# Patient Record
Sex: FEMALE | URBAN - METROPOLITAN AREA
[De-identification: names, ages, dates, MRNs, and addresses within clinical notes are randomized per-mention and may not be internally consistent; named-entity substitution may affect disease eponyms.]

---

## 2024-05-03 ENCOUNTER — OFFICE VISIT (OUTPATIENT)
Age: 39
End: 2024-05-03

## 2024-05-03 VITALS
WEIGHT: 262 LBS | OXYGEN SATURATION: 99 % | HEART RATE: 72 BPM | DIASTOLIC BLOOD PRESSURE: 80 MMHG | SYSTOLIC BLOOD PRESSURE: 132 MMHG | HEIGHT: 67 IN | BODY MASS INDEX: 41.12 KG/M2

## 2024-05-03 DIAGNOSIS — R73.03 PREDIABETES: ICD-10-CM

## 2024-05-03 DIAGNOSIS — Z82.49 FAMILY HISTORY OF HEART DISEASE: ICD-10-CM

## 2024-05-03 DIAGNOSIS — Z82.49 FAMILY HISTORY OF HEART ATTACK: Primary | ICD-10-CM

## 2024-05-03 DIAGNOSIS — R07.9 CHEST PAIN, UNSPECIFIED TYPE: ICD-10-CM

## 2024-05-03 RX ORDER — METFORMIN HYDROCHLORIDE 500 MG/1
500 TABLET, EXTENDED RELEASE ORAL
COMMUNITY

## 2024-05-03 ASSESSMENT — ENCOUNTER SYMPTOMS
GASTROINTESTINAL NEGATIVE: 1
EYES NEGATIVE: 1
RESPIRATORY NEGATIVE: 1

## 2024-05-03 NOTE — PATIENT INSTRUCTIONS
the medication list included in this document is our record of what you are currently taking, including any changes that were made at today's visit.  If you find any differences when compared to your medications at home, or have any questions that were not answered at your visit, please contact the office.

## 2024-05-03 NOTE — PROGRESS NOTES
mg/dL   03/19/2024 N   Interpretive Data: Child: Range(mg/dL)  Desirable &lt;170  Borderline 170 to 199  High &gt;=200  Adult: Range(mg/dL)  Desirable &lt;200  Borderline 200 to 239  High &gt;=240 Ambulatory Pharmacy   Chemistry Triglycerides 96 mg/dL   03/19/2024 N   Interpretive Data: NORMAL &lt;150  BORDERLINE HIGH 150   HIGH 200   VERY HIGH &gt;=500 Ambulatory Pharmacy   Chemistry VLDL 19.00 mg/dL   03/19             Joceline was seen today for new patient.    Diagnoses and all orders for this visit:    Family history of heart attack    Family history of heart disease  -     EKG 12 Lead    Chest pain, unspecified type  -     Exercise stress test; Future    Prediabetes          See patient instructions also for other medical advice given    There are no discontinued medications.    Follow-up and Dispositions    Return if symptoms worsen or fail to improve.           Return to ER if any significant CP not relieved by s/l NTG, severe SOB, severe palpitations, loss of consciousness    5/3/2024  Plan for medicines : Chest pain is atypical but given the strong premature family history of CAD on both sides of the parents, check a treadmill stress test.  If the stress test is normal, clinical follow-up with PCP.  She is moving to [place] and may need cardiac follow-up if stress test is abnormal.  Exercise Plan: Exercise regularly 30 to 40 minutes a day which should be moderate to heavy.  Diet plan: Mediterranean diet guidelines explained and printed.